# Patient Record
Sex: FEMALE | Race: WHITE | NOT HISPANIC OR LATINO | Employment: FULL TIME | URBAN - METROPOLITAN AREA
[De-identification: names, ages, dates, MRNs, and addresses within clinical notes are randomized per-mention and may not be internally consistent; named-entity substitution may affect disease eponyms.]

---

## 2018-02-23 ENCOUNTER — OFFICE VISIT (OUTPATIENT)
Dept: OBSTETRICS AND GYNECOLOGY | Age: 58
End: 2018-02-23

## 2018-02-23 VITALS
DIASTOLIC BLOOD PRESSURE: 68 MMHG | HEIGHT: 63 IN | SYSTOLIC BLOOD PRESSURE: 110 MMHG | BODY MASS INDEX: 24.98 KG/M2 | WEIGHT: 141 LBS

## 2018-02-23 DIAGNOSIS — Z01.419 WELL WOMAN EXAM WITH ROUTINE GYNECOLOGICAL EXAM: Primary | ICD-10-CM

## 2018-02-23 PROCEDURE — 99396 PREV VISIT EST AGE 40-64: CPT | Performed by: NURSE PRACTITIONER

## 2018-02-23 RX ORDER — OMEPRAZOLE 40 MG/1
40 CAPSULE, DELAYED RELEASE ORAL DAILY
COMMUNITY
End: 2020-08-13 | Stop reason: SDUPTHER

## 2018-02-23 RX ORDER — FLUOXETINE HYDROCHLORIDE 20 MG/1
40 CAPSULE ORAL DAILY
COMMUNITY
End: 2020-07-10 | Stop reason: SDUPTHER

## 2018-02-23 NOTE — PROGRESS NOTES
[facility]  Routine Annual Visit    2018    Patient: Lucrecia Hernandez          MR#:[id number]      History of Present Illness    57 y.o. female  who presents for annual exam. Last annual with Dr. Marie - pap negative.  Last mammogram negative 2017-negative per pt. Pt is postmenopausal- no bleeding. No HRT. Hx mild dysplasia. No colposcopy- discussing with Dr. Kehrer. Lucrecia has no complaints. No longer sexually active. Feeling great and healthy.    No LMP recorded (lmp unknown).  Obstetric History:  OB History      Para Term  AB Living    2 2 2   2    SAB TAB Ectopic Multiple Live Births        2         Menstrual History:     No LMP recorded (lmp unknown).       Sexual History:       ________________________________________  There is no problem list on file for this patient.      Past Medical History:   Diagnosis Date   • Anxiety    • Depression    • LGSIL on Pap smear of cervix        Past Surgical History:   Procedure Laterality Date   • LEEP  1996    leep times 2    • VEIN SURGERY      vein resection       History   Smoking Status   • Light Tobacco Smoker   Smokeless Tobacco   • Never Used       Family History   Problem Relation Age of Onset   • Hypertension Mother    • Hyperlipidemia Mother    • Coronary artery disease Mother    • Heart disease Mother    • Cancer Father 65     renal cell ca   • Seizures Father    • Lupus Father    • Heart disease Father    • Bipolar disorder Father    • Hypertension Brother    • Lung cancer Maternal Grandmother 78   • Other Paternal Grandmother      benign brain tumor   • Diabetes Paternal Grandmother    • Osteoporosis Paternal Grandmother    • Breast cancer Paternal Grandmother 80   • Diabetes Paternal Grandfather    • Coronary artery disease Paternal Grandfather    • Colon cancer Paternal Grandfather 66       Prior to Admission medications    Medication Sig Start Date End Date Taking? Authorizing Provider  "  FLUoxetine (PROzac) 20 MG capsule Take 40 mg by mouth Daily.   Yes Historical Provider, MD   omeprazole (priLOSEC) 40 MG capsule Take 40 mg by mouth Daily.   Yes Historical Provider, MD     ________________________________________    Current contraception: abstinence and post menopausal status  History of abnormal Pap smear: yes - mild dysplasia  Family history of uterine or ovarian cancer: no  Family History of colon cancer/colon polyps: no  History of abnormal mammogram: no  History of abnormal lipids: yes - on a statin    The following portions of the patient's history were reviewed and updated as appropriate: allergies, current medications, past family history, past medical history, past social history, past surgical history and problem list.    Review of Systems   Constitutional: Negative.    HENT: Negative.    Eyes: Negative for visual disturbance.   Respiratory: Negative for cough, shortness of breath and wheezing.    Cardiovascular: Negative for chest pain, palpitations and leg swelling.   Gastrointestinal: Negative for abdominal distention, abdominal pain, blood in stool, constipation, diarrhea, nausea and vomiting.   Endocrine: Negative for cold intolerance and heat intolerance.   Genitourinary: Negative for difficulty urinating, dyspareunia, dysuria, frequency, genital sores, hematuria, menstrual problem, pelvic pain, urgency, vaginal bleeding, vaginal discharge and vaginal pain.   Musculoskeletal: Negative.    Skin: Negative.    Neurological: Negative for dizziness, weakness, light-headedness, numbness and headaches.   Hematological: Negative.    Psychiatric/Behavioral: Negative.    Breasts: negative for lumps skin changes, dimpling, swelling, nipple changes/discharge bilaterally       Objective   Physical Exam    /68  Ht 160 cm (63\")  Wt 64 kg (141 lb)  LMP  (LMP Unknown) Comment: postmenopause  BMI 24.98 kg/m2   BP Readings from Last 3 Encounters:   02/23/18 110/68      Wt Readings from " "Last 3 Encounters:   02/23/18 64 kg (141 lb)        BMI: Estimated body mass index is 24.98 kg/(m^2) as calculated from the following:    Height as of this encounter: 160 cm (63\").    Weight as of this encounter: 64 kg (141 lb).      General:   alert, appears stated age and cooperative   Heart: regular rate and rhythm, S1, S2 normal, no murmur, click, rub or gallop   Lungs: clear to auscultation bilaterally   Abdomen: soft, non-tender, without masses or organomegaly   Breast: inspection negative, no nipple discharge or bleeding, no masses or nodularity palpable   Vulva: normal   Vagina: normal mucosa, normal discharge, atrophic   Cervix: no cervical motion tenderness, no lesions and atrophic   Uterus: normal size, mobile, non-tender or normal shape and consistency   Adnexa: no mass, fullness, tenderness     Assessment:    1. Normal annual exam   2. Healthy lifestyle modifications discussed, counseled on self breast exams and bone health. Recommend colonoscopy and DEXA.      Plan:    []  Rx:   []  Mammogram request made  [x]  PAP done  []  Occult fecal blood test ([facility])  []  Labs:   []  GC/Chl/TV  []  DEXA scan   []  Referral for colonoscopy:           MAGDA Martinez  2/23/2018 11:35 AM  "

## 2018-02-27 ENCOUNTER — TELEPHONE (OUTPATIENT)
Dept: OBSTETRICS AND GYNECOLOGY | Age: 58
End: 2018-02-27

## 2018-02-27 LAB
CYTOLOGIST CVX/VAG CYTO: NORMAL
CYTOLOGY CVX/VAG DOC THIN PREP: NORMAL
DX ICD CODE: NORMAL
HIV 1 & 2 AB SER-IMP: NORMAL
HPV I/H RISK 4 DNA CVX QL PROBE+SIG AMP: NEGATIVE
OTHER STN SPEC: NORMAL
PATH REPORT.FINAL DX SPEC: NORMAL
STAT OF ADQ CVX/VAG CYTO-IMP: NORMAL

## 2018-02-27 NOTE — TELEPHONE ENCOUNTER
----- Message from MAGDA Vilchis sent at 2/27/2018  9:16 AM EST -----  Please inform patient her pap smear returned negative (normal). Thank you.

## 2020-07-09 RX ORDER — FLUOXETINE HYDROCHLORIDE 40 MG/1
CAPSULE ORAL
Qty: 30 CAPSULE | Refills: 2 | OUTPATIENT
Start: 2020-07-09

## 2020-07-10 RX ORDER — FLUOXETINE HYDROCHLORIDE 40 MG/1
40 CAPSULE ORAL DAILY
Qty: 30 CAPSULE | Refills: 0 | Status: SHIPPED | OUTPATIENT
Start: 2020-07-10 | End: 2020-08-13 | Stop reason: SDUPTHER

## 2020-07-10 NOTE — TELEPHONE ENCOUNTER
PATIENT HAS AN APPOINTMENT ON 8/13/2020, SHE WAS LAST SEEN 07/29/2019 AT THE PREVIOUS OFFICE. SHE IS NEEDING A REFILL ON THE FLUOXETINE 40MG, ONE TABLET DAILY. WILL ONLY SEND IN ENOUGH UNTIL THAT APPOINTMENT.

## 2020-08-06 RX ORDER — FLUOXETINE HYDROCHLORIDE 40 MG/1
CAPSULE ORAL
Qty: 30 CAPSULE | Refills: 0 | OUTPATIENT
Start: 2020-08-06

## 2020-08-13 ENCOUNTER — OFFICE VISIT (OUTPATIENT)
Dept: FAMILY MEDICINE CLINIC | Facility: CLINIC | Age: 60
End: 2020-08-13

## 2020-08-13 ENCOUNTER — RESULTS ENCOUNTER (OUTPATIENT)
Dept: FAMILY MEDICINE CLINIC | Facility: CLINIC | Age: 60
End: 2020-08-13

## 2020-08-13 VITALS
BODY MASS INDEX: 25.83 KG/M2 | HEIGHT: 63 IN | TEMPERATURE: 96.6 F | DIASTOLIC BLOOD PRESSURE: 68 MMHG | WEIGHT: 145.8 LBS | SYSTOLIC BLOOD PRESSURE: 112 MMHG | OXYGEN SATURATION: 98 % | HEART RATE: 96 BPM

## 2020-08-13 DIAGNOSIS — Z23 NEED FOR PNEUMOCOCCAL VACCINE: ICD-10-CM

## 2020-08-13 DIAGNOSIS — Z11.59 NEED FOR HEPATITIS C SCREENING TEST: ICD-10-CM

## 2020-08-13 DIAGNOSIS — M15.9 PRIMARY OSTEOARTHRITIS INVOLVING MULTIPLE JOINTS: ICD-10-CM

## 2020-08-13 DIAGNOSIS — Z12.11 COLON CANCER SCREENING: ICD-10-CM

## 2020-08-13 DIAGNOSIS — G25.81 RESTLESS LEG SYNDROME: ICD-10-CM

## 2020-08-13 DIAGNOSIS — F43.23 ADJUSTMENT REACTION WITH ANXIETY AND DEPRESSION: ICD-10-CM

## 2020-08-13 DIAGNOSIS — Z23 NEED FOR TDAP VACCINATION: ICD-10-CM

## 2020-08-13 DIAGNOSIS — Z00.00 ROUTINE HEALTH MAINTENANCE: Primary | ICD-10-CM

## 2020-08-13 DIAGNOSIS — K21.9 GASTROESOPHAGEAL REFLUX DISEASE WITHOUT ESOPHAGITIS: ICD-10-CM

## 2020-08-13 DIAGNOSIS — Z79.899 CURRENT USE OF PROTON PUMP INHIBITOR: ICD-10-CM

## 2020-08-13 DIAGNOSIS — E78.5 HYPERLIPIDEMIA, UNSPECIFIED HYPERLIPIDEMIA TYPE: ICD-10-CM

## 2020-08-13 DIAGNOSIS — Z12.31 VISIT FOR SCREENING MAMMOGRAM: ICD-10-CM

## 2020-08-13 PROBLEM — M16.12 ARTHRITIS OF LEFT HIP: Status: ACTIVE | Noted: 2019-08-08

## 2020-08-13 PROCEDURE — 90472 IMMUNIZATION ADMIN EACH ADD: CPT | Performed by: FAMILY MEDICINE

## 2020-08-13 PROCEDURE — 90732 PPSV23 VACC 2 YRS+ SUBQ/IM: CPT | Performed by: FAMILY MEDICINE

## 2020-08-13 PROCEDURE — 99396 PREV VISIT EST AGE 40-64: CPT | Performed by: FAMILY MEDICINE

## 2020-08-13 PROCEDURE — 90471 IMMUNIZATION ADMIN: CPT | Performed by: FAMILY MEDICINE

## 2020-08-13 PROCEDURE — 90715 TDAP VACCINE 7 YRS/> IM: CPT | Performed by: FAMILY MEDICINE

## 2020-08-13 RX ORDER — ROPINIROLE 0.25 MG/1
TABLET, FILM COATED ORAL
Qty: 104 TABLET | Refills: 0 | Status: SHIPPED | OUTPATIENT
Start: 2020-08-13 | End: 2020-09-15 | Stop reason: DRUGHIGH

## 2020-08-13 RX ORDER — FLUOXETINE HYDROCHLORIDE 40 MG/1
40 CAPSULE ORAL DAILY
Qty: 90 CAPSULE | Refills: 3 | Status: SHIPPED | OUTPATIENT
Start: 2020-08-13 | End: 2020-11-19

## 2020-08-13 RX ORDER — OMEPRAZOLE 40 MG/1
40 CAPSULE, DELAYED RELEASE ORAL DAILY
Qty: 90 CAPSULE | Refills: 3 | Status: SHIPPED | OUTPATIENT
Start: 2020-08-13 | End: 2021-08-26 | Stop reason: SDUPTHER

## 2020-08-13 RX ORDER — ROPINIROLE 0.25 MG/1
TABLET, FILM COATED ORAL
Qty: 104 TABLET | Refills: 0 | Status: CANCELLED | OUTPATIENT
Start: 2020-08-13 | End: 2020-09-12
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
## 2020-08-13 NOTE — TELEPHONE ENCOUNTER
It has to be printed because the Sig is too long to be E prescribed.  We can just fax the printed one to the pharmacy.

## 2020-08-15 LAB
ALBUMIN SERPL-MCNC: 4.9 G/DL (ref 3.8–4.8)
ALBUMIN/GLOB SERPL: 2 {RATIO} (ref 1.2–2.2)
ALP SERPL-CCNC: 111 IU/L (ref 39–117)
ALT SERPL-CCNC: 35 IU/L (ref 0–32)
AST SERPL-CCNC: 27 IU/L (ref 0–40)
BASOPHILS # BLD AUTO: 0.1 X10E3/UL (ref 0–0.2)
BASOPHILS NFR BLD AUTO: 1 %
BILIRUB SERPL-MCNC: 0.4 MG/DL (ref 0–1.2)
BUN SERPL-MCNC: 13 MG/DL (ref 8–27)
BUN/CREAT SERPL: 13 (ref 12–28)
CALCIUM SERPL-MCNC: 9.9 MG/DL (ref 8.7–10.3)
CHLORIDE SERPL-SCNC: 103 MMOL/L (ref 96–106)
CHOLEST SERPL-MCNC: 252 MG/DL (ref 100–199)
CO2 SERPL-SCNC: 27 MMOL/L (ref 20–29)
CREAT SERPL-MCNC: 1.04 MG/DL (ref 0.57–1)
EOSINOPHIL # BLD AUTO: 0.2 X10E3/UL (ref 0–0.4)
EOSINOPHIL NFR BLD AUTO: 3 %
ERYTHROCYTE [DISTWIDTH] IN BLOOD BY AUTOMATED COUNT: 13.5 % (ref 11.7–15.4)
GLOBULIN SER CALC-MCNC: 2.5 G/DL (ref 1.5–4.5)
GLUCOSE SERPL-MCNC: 78 MG/DL (ref 65–99)
HCT VFR BLD AUTO: 43 % (ref 34–46.6)
HCV AB S/CO SERPL IA: <0.1 S/CO RATIO (ref 0–0.9)
HDLC SERPL-MCNC: 60 MG/DL
HGB BLD-MCNC: 14.2 G/DL (ref 11.1–15.9)
IMM GRANULOCYTES # BLD AUTO: 0 X10E3/UL (ref 0–0.1)
IMM GRANULOCYTES NFR BLD AUTO: 0 %
LDLC SERPL CALC-MCNC: 174 MG/DL (ref 0–99)
LYMPHOCYTES # BLD AUTO: 2.7 X10E3/UL (ref 0.7–3.1)
LYMPHOCYTES NFR BLD AUTO: 44 %
MCH RBC QN AUTO: 29.3 PG (ref 26.6–33)
MCHC RBC AUTO-ENTMCNC: 33 G/DL (ref 31.5–35.7)
MCV RBC AUTO: 89 FL (ref 79–97)
MONOCYTES # BLD AUTO: 0.5 X10E3/UL (ref 0.1–0.9)
MONOCYTES NFR BLD AUTO: 8 %
NEUTROPHILS # BLD AUTO: 2.6 X10E3/UL (ref 1.4–7)
NEUTROPHILS NFR BLD AUTO: 44 %
PLATELET # BLD AUTO: 251 X10E3/UL (ref 150–450)
POTASSIUM SERPL-SCNC: 4.5 MMOL/L (ref 3.5–5.2)
PROT SERPL-MCNC: 7.4 G/DL (ref 6–8.5)
RBC # BLD AUTO: 4.85 X10E6/UL (ref 3.77–5.28)
SODIUM SERPL-SCNC: 141 MMOL/L (ref 134–144)
TRIGL SERPL-MCNC: 88 MG/DL (ref 0–149)
TSH SERPL DL<=0.005 MIU/L-ACNC: 0.72 UIU/ML (ref 0.45–4.5)
VIT B12 SERPL-MCNC: 674 PG/ML (ref 232–1245)
VLDLC SERPL CALC-MCNC: 18 MG/DL (ref 5–40)
WBC # BLD AUTO: 6 X10E3/UL (ref 3.4–10.8)

## 2020-08-26 ENCOUNTER — TELEPHONE (OUTPATIENT)
Dept: FAMILY MEDICINE CLINIC | Facility: CLINIC | Age: 60
End: 2020-08-26

## 2020-09-15 ENCOUNTER — TELEMEDICINE - AUDIO (OUTPATIENT)
Dept: FAMILY MEDICINE CLINIC | Facility: CLINIC | Age: 60
End: 2020-09-15

## 2020-09-15 DIAGNOSIS — G25.81 RESTLESS LEG SYNDROME: Primary | ICD-10-CM

## 2020-09-15 DIAGNOSIS — Z12.31 VISIT FOR SCREENING MAMMOGRAM: ICD-10-CM

## 2020-09-15 PROCEDURE — 99213 OFFICE O/P EST LOW 20 MIN: CPT | Performed by: FAMILY MEDICINE

## 2020-09-15 RX ORDER — ROPINIROLE 1 MG/1
1 TABLET, FILM COATED ORAL NIGHTLY
Qty: 90 TABLET | Refills: 1 | Status: SHIPPED | OUTPATIENT
Start: 2020-09-15 | End: 2021-02-23 | Stop reason: SDUPTHER

## 2020-09-15 NOTE — PROGRESS NOTES
This was an audio and video enabled telemedicine encounter.  Length of visit 15 minutes.     Subjective   Lucrecia Hernandez is a 60 y.o. female.     Chief Complaint   Patient presents with   • Follow-up     Restless Leg        Patient presents for follow-up on her restless leg syndrome through virtual visit during the pandemic.  She is getting up once or twice at night only now and states that her RLS is about 90% better.    She has no significant complaints today.  She had her full physical last time.           The following portions of the patient's history were reviewed and updated as appropriate: allergies, current medications, past family history, past medical history, past social history, past surgical history and problem list.    Past Medical History:   Diagnosis Date   • Acrochordon    • Adjustment reaction with anxiety and depression    • Anxiety    • Degenerative lumbar disc    • Depression    • Elevated transaminase level    • Encounter for long-term (current) use of medications    • Encounter for routine gynecological examination    • Esophageal reflux disease    • Flu vaccine need    • History of anxiety disorder    • History of depression    • Hyperlipidemia    • Insomnia    • LGSIL on Pap smear of cervix 2011   • Lumbar radiculopathy    • Memory change    • Menopausal symptoms    • Need for DTaP and Hib vaccine     History of    • Need for hepatitis A vaccination    • Need for hepatitis C screening test    • OA (osteoarthritis) of hip    • Pain in joint of left hip    • Pain in joint of right hip    • Screening for malignant neoplasm of colon    • Seborrheic keratosis    • Tobacco use    • Visit for screening mammogram    • Weight gain        Past Surgical History:   Procedure Laterality Date   • LEEP  1996    leep times 2 1996/1997   • OTHER SURGICAL HISTORY  1994    Stab Phlebectomy of Varicose veins one extremity-Abstracted from [facility]   • VEIN SURGERY  1994    vein resection       Family History    Problem Relation Age of Onset   • Hypertension Mother    • Hyperlipidemia Mother    • Coronary artery disease Mother    • Heart disease Mother    • Cancer Father 65        renal cell ca   • Seizures Father    • Lupus Father    • Heart disease Father    • Bipolar disorder Father    • Hypertension Brother    • Lung cancer Maternal Grandmother 78   • Other Paternal Grandmother         benign brain tumor   • Diabetes Paternal Grandmother    • Osteoporosis Paternal Grandmother    • Breast cancer Paternal Grandmother 80   • Diabetes Paternal Grandfather    • Coronary artery disease Paternal Grandfather    • Colon cancer Paternal Grandfather 66   • Bipolar disorder Other        Social History     Socioeconomic History   • Marital status:      Spouse name: Not on file   • Number of children: Not on file   • Years of education: Not on file   • Highest education level: Not on file   Tobacco Use   • Smoking status: Smoker, Current Status Unknown   • Smokeless tobacco: Never Used   Substance and Sexual Activity   • Alcohol use: Yes     Alcohol/week: 4.0 - 6.0 standard drinks     Types: 4 - 6 Cans of beer per week   • Drug use: No   • Sexual activity: Never     Partners: Male     Birth control/protection: None         Current Outpatient Medications:   •  diclofenac (VOLTAREN) 1 % gel gel, Apply 4 g topically to the appropriate area as directed 4 (Four) Times a Day As Needed (pain)., Disp: 100 g, Rfl: 3  •  FLUoxetine (PROzac) 40 MG capsule, Take 1 capsule by mouth Daily. MUST KEEP APPOINTMENT, NO MORE REFILLS., Disp: 90 capsule, Rfl: 3  •  omeprazole (priLOSEC) 40 MG capsule, Take 1 capsule by mouth Daily., Disp: 90 capsule, Rfl: 3    Review of Systems   Constitutional: Negative for chills, fatigue and fever.   HENT: Negative for congestion, rhinorrhea and sore throat.    Respiratory: Negative for cough and shortness of breath.    Cardiovascular: Negative for chest pain and leg swelling.   Gastrointestinal: Negative  for abdominal pain.   Endocrine: Negative for polydipsia and polyuria.   Genitourinary: Negative for dysuria.   Musculoskeletal: Negative for arthralgias and myalgias.   Skin: Negative for rash.   Neurological: Negative for dizziness.   Hematological: Does not bruise/bleed easily.   Psychiatric/Behavioral: Negative for sleep disturbance.       Objective   There were no vitals filed for this visit.  There is no height or weight on file to calculate BMI.  Physical Exam  Had to convert to a phone visit due to technical difficulties.    Assessment/Plan   Lucrecia was seen today for follow-up.    Diagnoses and all orders for this visit:    Restless leg syndrome               There are no Patient Instructions on file for this visit.

## 2020-11-12 ENCOUNTER — OFFICE VISIT (OUTPATIENT)
Dept: FAMILY MEDICINE CLINIC | Facility: CLINIC | Age: 60
End: 2020-11-12

## 2020-11-12 VITALS
TEMPERATURE: 96 F | BODY MASS INDEX: 25.98 KG/M2 | OXYGEN SATURATION: 99 % | DIASTOLIC BLOOD PRESSURE: 74 MMHG | HEIGHT: 63 IN | WEIGHT: 146.6 LBS | SYSTOLIC BLOOD PRESSURE: 122 MMHG | HEART RATE: 91 BPM

## 2020-11-12 DIAGNOSIS — G25.81 RESTLESS LEG SYNDROME: Primary | ICD-10-CM

## 2020-11-12 DIAGNOSIS — F43.23 ADJUSTMENT REACTION WITH ANXIETY AND DEPRESSION: ICD-10-CM

## 2020-11-12 DIAGNOSIS — E78.5 HYPERLIPIDEMIA, UNSPECIFIED HYPERLIPIDEMIA TYPE: ICD-10-CM

## 2020-11-12 DIAGNOSIS — Z23 NEED FOR IMMUNIZATION AGAINST INFLUENZA: ICD-10-CM

## 2020-11-12 PROCEDURE — 99213 OFFICE O/P EST LOW 20 MIN: CPT | Performed by: FAMILY MEDICINE

## 2020-11-12 PROCEDURE — 90686 IIV4 VACC NO PRSV 0.5 ML IM: CPT | Performed by: FAMILY MEDICINE

## 2020-11-12 PROCEDURE — 90471 IMMUNIZATION ADMIN: CPT | Performed by: FAMILY MEDICINE

## 2020-11-12 NOTE — PROGRESS NOTES
Subjective   Lucrecia Hernandez is a 60 y.o. female.     Chief Complaint   Patient presents with   • Restless Legs Syndrome   • Hyperlipidemia                              Patient presents for follow-up.  She is doing well in general on her current medications for her mood and RLS.  She is not due for fasting blood work.  She needs refills of her medications and her flu shot today.  She is sad over the loss of a niece to suicide for depression.  She feels that her medication really helps her.  Though she denies SI and HI.  She denies hopelessness.         The following portions of the patient's history were reviewed and updated as appropriate: allergies, current medications, past family history, past medical history, past social history, past surgical history and problem list.    Past Medical History:   Diagnosis Date   • Acrochordon    • Adjustment reaction with anxiety and depression    • Anxiety    • Degenerative lumbar disc    • Depression    • Elevated transaminase level    • Encounter for long-term (current) use of medications    • Encounter for routine gynecological examination    • Esophageal reflux disease    • Flu vaccine need    • History of anxiety disorder    • History of depression    • Hyperlipidemia    • Insomnia    • LGSIL on Pap smear of cervix 2011   • Lumbar radiculopathy    • Memory change    • Menopausal symptoms    • Need for DTaP and Hib vaccine     History of    • Need for hepatitis A vaccination    • Need for hepatitis C screening test    • OA (osteoarthritis) of hip    • Pain in joint of left hip    • Pain in joint of right hip    • Screening for malignant neoplasm of colon    • Seborrheic keratosis    • Tobacco use    • Visit for screening mammogram    • Weight gain        Past Surgical History:   Procedure Laterality Date   • LEEP  1996    leep times 2 1996/1997   • OTHER SURGICAL HISTORY  1994    Stab Phlebectomy of Varicose veins one extremity-Abstracted from [facility]   • VEIN SURGERY  1994     vein resection       Family History   Problem Relation Age of Onset   • Hypertension Mother    • Hyperlipidemia Mother    • Coronary artery disease Mother    • Heart disease Mother    • Cancer Father 65        renal cell ca   • Seizures Father    • Lupus Father    • Heart disease Father    • Bipolar disorder Father    • Hypertension Brother    • Lung cancer Maternal Grandmother 78   • Other Paternal Grandmother         benign brain tumor   • Diabetes Paternal Grandmother    • Osteoporosis Paternal Grandmother    • Breast cancer Paternal Grandmother 80   • Diabetes Paternal Grandfather    • Coronary artery disease Paternal Grandfather    • Colon cancer Paternal Grandfather 66   • Bipolar disorder Other        Social History     Socioeconomic History   • Marital status:      Spouse name: Not on file   • Number of children: Not on file   • Years of education: Not on file   • Highest education level: Not on file   Tobacco Use   • Smoking status: Smoker, Current Status Unknown   • Smokeless tobacco: Never Used   Substance and Sexual Activity   • Alcohol use: Yes     Alcohol/week: 4.0 - 6.0 standard drinks     Types: 4 - 6 Cans of beer per week   • Drug use: No   • Sexual activity: Never     Partners: Male     Birth control/protection: None         Current Outpatient Medications:   •  diclofenac (VOLTAREN) 1 % gel gel, Apply 4 g topically to the appropriate area as directed 4 (Four) Times a Day As Needed (pain)., Disp: 100 g, Rfl: 3  •  FLUoxetine (PROzac) 40 MG capsule, Take 1 capsule by mouth Daily. MUST KEEP APPOINTMENT, NO MORE REFILLS., Disp: 90 capsule, Rfl: 3  •  omeprazole (priLOSEC) 40 MG capsule, Take 1 capsule by mouth Daily., Disp: 90 capsule, Rfl: 3  •  rOPINIRole (Requip) 1 MG tablet, Take 1 tablet by mouth Every Night. Take 1 hour before bedtime., Disp: 90 tablet, Rfl: 1    Review of Systems   Constitutional: Negative for chills, fatigue and fever.   HENT: Negative for congestion, rhinorrhea and  "sore throat.    Respiratory: Negative for cough and shortness of breath.    Cardiovascular: Negative for chest pain and leg swelling.   Gastrointestinal: Negative for abdominal pain.   Endocrine: Negative for polydipsia and polyuria.   Genitourinary: Negative for dysuria.   Musculoskeletal: Negative for arthralgias and myalgias.   Skin: Negative for rash.   Neurological: Negative for dizziness.   Hematological: Does not bruise/bleed easily.   Psychiatric/Behavioral: Negative for sleep disturbance.       Objective   Vitals:    11/12/20 1021   BP: 122/74   Pulse: 91   Temp: 96 °F (35.6 °C)   SpO2: 99%   Weight: 66.5 kg (146 lb 9.6 oz)   Height: 160 cm (63\")     Body mass index is 25.97 kg/m².  Physical Exam  Constitutional:       General: She is not in acute distress.     Appearance: Normal appearance. She is well-developed.   HENT:      Head: Normocephalic and atraumatic.      Right Ear: Tympanic membrane, ear canal and external ear normal.      Left Ear: Tympanic membrane, ear canal and external ear normal.      Mouth/Throat:      Mouth: Mucous membranes are moist.      Pharynx: Oropharynx is clear.   Eyes:      Conjunctiva/sclera: Conjunctivae normal.      Pupils: Pupils are equal, round, and reactive to light.   Neck:      Musculoskeletal: Neck supple.      Thyroid: No thyromegaly.   Cardiovascular:      Rate and Rhythm: Normal rate and regular rhythm.      Heart sounds: No murmur.   Pulmonary:      Effort: Pulmonary effort is normal.      Breath sounds: Normal breath sounds. No wheezing.   Abdominal:      General: Bowel sounds are normal.      Palpations: Abdomen is soft.      Tenderness: There is no abdominal tenderness.   Musculoskeletal: Normal range of motion.   Lymphadenopathy:      Cervical: No cervical adenopathy.   Skin:     General: Skin is warm and dry.   Neurological:      Mental Status: She is alert and oriented to person, place, and time.   Psychiatric:         Mood and Affect: Mood normal.         " Behavior: Behavior normal.           Assessment/Plan   Diagnoses and all orders for this visit:    1. Restless leg syndrome (Primary)    2. Adjustment reaction with anxiety and depression    3. Hyperlipidemia, unspecified hyperlipidemia type    4. Need for immunization against influenza  -     FluLaval Quad >6 Months ([id number])               There are no Patient Instructions on file for this visit.

## 2020-11-19 DIAGNOSIS — F43.23 ADJUSTMENT REACTION WITH ANXIETY AND DEPRESSION: ICD-10-CM

## 2020-11-19 RX ORDER — FLUOXETINE HYDROCHLORIDE 40 MG/1
CAPSULE ORAL
Qty: 90 CAPSULE | Refills: 3 | Status: SHIPPED | OUTPATIENT
Start: 2020-11-19 | End: 2021-02-23

## 2021-02-23 ENCOUNTER — OFFICE VISIT (OUTPATIENT)
Dept: FAMILY MEDICINE CLINIC | Facility: CLINIC | Age: 61
End: 2021-02-23

## 2021-02-23 VITALS
DIASTOLIC BLOOD PRESSURE: 68 MMHG | BODY MASS INDEX: 26.4 KG/M2 | SYSTOLIC BLOOD PRESSURE: 128 MMHG | HEART RATE: 90 BPM | OXYGEN SATURATION: 99 % | WEIGHT: 149 LBS | HEIGHT: 63 IN | TEMPERATURE: 97.3 F

## 2021-02-23 DIAGNOSIS — K21.9 GASTROESOPHAGEAL REFLUX DISEASE WITHOUT ESOPHAGITIS: Chronic | ICD-10-CM

## 2021-02-23 DIAGNOSIS — G25.81 RESTLESS LEG SYNDROME: Primary | Chronic | ICD-10-CM

## 2021-02-23 DIAGNOSIS — F43.21 GRIEF: ICD-10-CM

## 2021-02-23 PROBLEM — F43.23 ADJUSTMENT REACTION WITH ANXIETY AND DEPRESSION: Status: RESOLVED | Noted: 2020-08-13 | Resolved: 2021-02-23

## 2021-02-23 PROCEDURE — 99213 OFFICE O/P EST LOW 20 MIN: CPT | Performed by: FAMILY MEDICINE

## 2021-02-23 RX ORDER — ROPINIROLE 1 MG/1
1 TABLET, FILM COATED ORAL NIGHTLY
Qty: 90 TABLET | Refills: 1 | Status: SHIPPED | OUTPATIENT
Start: 2021-02-23 | End: 2021-08-26

## 2021-02-23 NOTE — PROGRESS NOTES
"Chief Complaint  Anxiety (follow up on mood) and Restless Legs Syndrome    Subjective          Lucrecia Hernandez presents to [facility]  Patient presents to follow-up on her restless leg syndrome.  She states the ropinirole really helps.  She gets a good full night sleep.  As far as her mood, she did stop the fluoxetine.  She feels like she does not need it for her mood.  She denies any significant depressive or anxiety symptoms.  They did find a letter that her niece left after committing suicide in October and that really helped give her some closure.  She feels that she is handling her grief much better.  She is also doing better with the hot flashes so really does not want the fluoxetine anymore.  She still needs her medicine for her reflux daily.  She is due for her physical in 6 months.         Objective   Vital Signs:   /68   Pulse 90   Temp 97.3 °F (36.3 °C)   Ht 160 cm (63\")   Wt 67.6 kg (149 lb)   SpO2 99%   BMI 26.39 kg/m²     Physical Exam  Vitals signs and nursing note reviewed.   Constitutional:       General: She is not in acute distress.  HENT:      Head: Normocephalic and atraumatic.      Right Ear: External ear normal.      Left Ear: External ear normal.   Eyes:      Conjunctiva/sclera: Conjunctivae normal.      Pupils: Pupils are equal, round, and reactive to light.   Cardiovascular:      Rate and Rhythm: Normal rate and regular rhythm.      Heart sounds: No murmur.   Pulmonary:      Effort: No respiratory distress.   Neurological:      Mental Status: She is alert and oriented to person, place, and time.   Psychiatric:         Mood and Affect: Mood normal.         Behavior: Behavior normal.        Result Review :                 Assessment and Plan    Diagnoses and all orders for this visit:    1. Restless leg syndrome (Primary)  -     rOPINIRole (Requip) 1 MG tablet; Take 1 tablet by mouth Every Night. Take 1 hour before bedtime.  Dispense: 90 tablet; Refill: " 1    2. Gastroesophageal reflux disease without esophagitis    3. Grief        Follow Up   Return in about 6 months (around 8/23/2021) for Annual physical.  Patient was given instructions and counseling regarding her condition or for health maintenance advice. Please see specific information pulled into the AVS if appropriate.

## 2021-08-26 ENCOUNTER — OFFICE VISIT (OUTPATIENT)
Dept: FAMILY MEDICINE CLINIC | Facility: CLINIC | Age: 61
End: 2021-08-26

## 2021-08-26 VITALS
DIASTOLIC BLOOD PRESSURE: 78 MMHG | HEART RATE: 111 BPM | SYSTOLIC BLOOD PRESSURE: 122 MMHG | OXYGEN SATURATION: 99 % | TEMPERATURE: 98.2 F | WEIGHT: 144.2 LBS | BODY MASS INDEX: 25.55 KG/M2 | HEIGHT: 63 IN

## 2021-08-26 DIAGNOSIS — K21.9 GASTROESOPHAGEAL REFLUX DISEASE WITHOUT ESOPHAGITIS: ICD-10-CM

## 2021-08-26 DIAGNOSIS — E78.5 HYPERLIPIDEMIA, UNSPECIFIED HYPERLIPIDEMIA TYPE: ICD-10-CM

## 2021-08-26 DIAGNOSIS — Z00.00 ROUTINE HEALTH MAINTENANCE: Primary | ICD-10-CM

## 2021-08-26 DIAGNOSIS — Z72.0 TOBACCO USE: ICD-10-CM

## 2021-08-26 DIAGNOSIS — Z79.899 CURRENT USE OF PROTON PUMP INHIBITOR: ICD-10-CM

## 2021-08-26 DIAGNOSIS — Z12.31 VISIT FOR SCREENING MAMMOGRAM: ICD-10-CM

## 2021-08-26 DIAGNOSIS — M15.9 PRIMARY OSTEOARTHRITIS INVOLVING MULTIPLE JOINTS: ICD-10-CM

## 2021-08-26 DIAGNOSIS — G25.81 RESTLESS LEG SYNDROME: ICD-10-CM

## 2021-08-26 PROCEDURE — 99396 PREV VISIT EST AGE 40-64: CPT | Performed by: FAMILY MEDICINE

## 2021-08-26 PROCEDURE — 99213 OFFICE O/P EST LOW 20 MIN: CPT | Performed by: FAMILY MEDICINE

## 2021-08-26 RX ORDER — BUPROPION HYDROCHLORIDE 150 MG/1
150 TABLET, EXTENDED RELEASE ORAL 2 TIMES DAILY
Qty: 180 TABLET | Refills: 1 | Status: SHIPPED | OUTPATIENT
Start: 2021-08-26 | End: 2022-03-02 | Stop reason: DRUGHIGH

## 2021-08-26 RX ORDER — MELOXICAM 15 MG/1
15 TABLET ORAL DAILY
Qty: 90 TABLET | Refills: 1 | Status: SHIPPED | OUTPATIENT
Start: 2021-08-26 | End: 2022-09-02

## 2021-08-26 RX ORDER — ROPINIROLE 1 MG/1
1 TABLET, FILM COATED ORAL NIGHTLY
Qty: 90 TABLET | Refills: 3 | Status: SHIPPED | OUTPATIENT
Start: 2021-08-26 | End: 2022-01-31

## 2021-08-26 RX ORDER — OMEPRAZOLE 40 MG/1
40 CAPSULE, DELAYED RELEASE ORAL DAILY
Qty: 90 CAPSULE | Refills: 3 | Status: SHIPPED | OUTPATIENT
Start: 2021-08-26 | End: 2022-09-02 | Stop reason: SDUPTHER

## 2021-08-26 NOTE — PROGRESS NOTES
Subjective   Lucrecia Hernandez is a 60 y.o. female who presents for annual female wellness exam.  Chief Complaint   Patient presents with   • Annual Exam     no pap, pt sees OB     Doing well in general.  Fasting this am.   RLS doing OK.  Compliant with medication.  Has had some increased arthritis that she would like help with.  Over-the-counter medications have not helped much.  Gets aches and pains in multiple joints.  Patient would also like help quitting smoking.  Wellbutrin helped in the past.    Menstrual History: menopause  Pregnancy History:   Sexual History: not now   Contraception: na  Hormone Replacement Therapy: na  Diet: standard  Exercise: yes, walks  Do you feel safe? No, afraid to go out because of violence  Have you ever been abused? no    Mammogram: due  Pap Smear: sees gyn  Bone Density: defers  Colon Cancer Screening: cologuard     Immunization History   Administered Date(s) Administered   • COVID-19 (MODERNA) 2021, 2021   • Flu Vaccine Intradermal Quad 18-64YR 2018   • FluLaval >6 Months 2020   • Hepatitis A 2018, 2019   • Influenza, Unspecified 2018   • Pneumococcal Polysaccharide (PPSV23) 2020   • Td 2011   • Tdap 2020       The following portions of the patient's history were reviewed and updated as appropriate: allergies, current medications, past family history, past medical history, past social history, past surgical history and problem list.    Past Medical History:   Diagnosis Date   • Acrochordon    • Adjustment reaction with anxiety and depression    • Anxiety    • Degenerative lumbar disc    • Depression    • Elevated transaminase level    • Encounter for long-term (current) use of medications    • Encounter for routine gynecological examination    • Esophageal reflux disease    • Flu vaccine need    • History of anxiety disorder    • History of depression    • Hyperlipidemia    • Insomnia    • LGSIL on Pap smear of  cervix 2011   • Lumbar radiculopathy    • Memory change    • Menopausal symptoms    • Need for DTaP and Hib vaccine     History of    • Need for hepatitis A vaccination    • Need for hepatitis C screening test    • OA (osteoarthritis) of hip    • Pain in joint of left hip    • Pain in joint of right hip    • Screening for malignant neoplasm of colon    • Seborrheic keratosis    • Tobacco use    • Visit for screening mammogram    • Weight gain        Past Surgical History:   Procedure Laterality Date   • LEEP  1996    leep times 2 1996/1997   • OTHER SURGICAL HISTORY  1994    Stab Phlebectomy of Varicose veins one extremity-Abstracted from [facility]   • VEIN SURGERY  1994    vein resection       Family History   Problem Relation Age of Onset   • Hypertension Mother    • Hyperlipidemia Mother    • Coronary artery disease Mother    • Heart disease Mother    • Cancer Father 65        renal cell ca   • Seizures Father    • Lupus Father    • Heart disease Father    • Bipolar disorder Father    • Hypertension Brother    • Lung cancer Maternal Grandmother 78   • Other Paternal Grandmother         benign brain tumor   • Diabetes Paternal Grandmother    • Osteoporosis Paternal Grandmother    • Breast cancer Paternal Grandmother 80   • Diabetes Paternal Grandfather    • Coronary artery disease Paternal Grandfather    • Colon cancer Paternal Grandfather 66   • Bipolar disorder Other        Social History     Socioeconomic History   • Marital status:      Spouse name: Not on file   • Number of children: Not on file   • Years of education: Not on file   • Highest education level: Not on file   Tobacco Use   • Smoking status: Smoker, Current Status Unknown   • Smokeless tobacco: Never Used   Substance and Sexual Activity   • Alcohol use: Yes     Alcohol/week: 4.0 - 6.0 standard drinks     Types: 4 - 6 Cans of beer per week   • Drug use: No   • Sexual activity: Never     Partners: Male     Birth control/protection: None  "        Current Outpatient Medications:   •  diclofenac (VOLTAREN) 1 % gel gel, Apply 4 g topically to the appropriate area as directed 4 (Four) Times a Day As Needed (pain)., Disp: 100 g, Rfl: 3  •  omeprazole (priLOSEC) 40 MG capsule, Take 1 capsule by mouth Daily., Disp: 90 capsule, Rfl: 3  •  rOPINIRole (Requip) 1 MG tablet, Take 1 tablet by mouth Every Night. Take 1 hour before bedtime., Disp: 90 tablet, Rfl: 3  •  buPROPion SR (Wellbutrin SR) 150 MG 12 hr tablet, Take 1 tablet by mouth 2 (Two) Times a Day., Disp: 180 tablet, Rfl: 1  •  meloxicam (MOBIC) 15 MG tablet, Take 1 tablet by mouth Daily., Disp: 90 tablet, Rfl: 1    Review of Systems    Objective   Vitals:    08/26/21 0840   BP: 122/78   Pulse: 111   Temp: 98.2 °F (36.8 °C)   SpO2: 99%   Weight: 65.4 kg (144 lb 3.2 oz)   Height: 160 cm (63\")     Body mass index is 25.54 kg/m².  Physical Exam  Vitals and nursing note reviewed.   Constitutional:       General: She is not in acute distress.     Appearance: Normal appearance. She is well-developed.   HENT:      Head: Normocephalic and atraumatic.      Right Ear: Tympanic membrane, ear canal and external ear normal.      Left Ear: Tympanic membrane, ear canal and external ear normal.      Nose: Nose normal.      Mouth/Throat:      Mouth: Mucous membranes are moist.      Pharynx: Oropharynx is clear. No oropharyngeal exudate or posterior oropharyngeal erythema.   Eyes:      Conjunctiva/sclera: Conjunctivae normal.      Pupils: Pupils are equal, round, and reactive to light.   Neck:      Thyroid: No thyromegaly.   Cardiovascular:      Rate and Rhythm: Normal rate and regular rhythm.      Heart sounds: No murmur heard.     Pulmonary:      Effort: Pulmonary effort is normal.      Breath sounds: Normal breath sounds. No wheezing.   Abdominal:      General: Abdomen is flat. Bowel sounds are normal. There is no distension.      Palpations: Abdomen is soft. There is no mass.      Tenderness: There is no abdominal " tenderness.      Hernia: No hernia is present.   Musculoskeletal:         General: No swelling. Normal range of motion.      Cervical back: Normal range of motion and neck supple.      Right lower leg: No edema.      Left lower leg: No edema.   Lymphadenopathy:      Cervical: No cervical adenopathy.   Skin:     General: Skin is warm and dry.      Capillary Refill: Capillary refill takes less than 2 seconds.      Findings: No rash.   Neurological:      General: No focal deficit present.      Mental Status: She is alert and oriented to person, place, and time.      Cranial Nerves: No cranial nerve deficit.   Psychiatric:         Mood and Affect: Mood normal.         Behavior: Behavior normal.       Lucrecia Hernandez  reports that she has been smoking. She has never used smokeless tobacco.. I have educated her on the risk of diseases from using tobacco products such as cancer, COPD and heart disease.     I advised her to quit and she is willing to quit. We have discussed the following method/s for tobacco cessation:  Education Material.  Together we have set a quit date for 1 week.  She will follow up with me in 3 months or sooner to check on her progress.    I spent 5 minutes counseling the patient.         Assessment/Plan   Diagnoses and all orders for this visit:    1. Routine health maintenance (Primary)    2. Hyperlipidemia, unspecified hyperlipidemia type  -     Lipid Panel  -     CBC & Differential  -     Comprehensive Metabolic Panel  -     TSH    3. Gastroesophageal reflux disease without esophagitis  -     omeprazole (priLOSEC) 40 MG capsule; Take 1 capsule by mouth Daily.  Dispense: 90 capsule; Refill: 3    4. Restless leg syndrome  -     rOPINIRole (Requip) 1 MG tablet; Take 1 tablet by mouth Every Night. Take 1 hour before bedtime.  Dispense: 90 tablet; Refill: 3    5. Primary osteoarthritis involving multiple joints  -     meloxicam (MOBIC) 15 MG tablet; Take 1 tablet by mouth Daily.  Dispense: 90 tablet;  Refill: 1    6. Visit for screening mammogram  -     Mammo Screening Digital Tomosynthesis Bilateral With CAD; Future    7. Current use of proton pump inhibitor  -     Vitamin B12    8. Tobacco use  -     buPROPion SR (Wellbutrin SR) 150 MG 12 hr tablet; Take 1 tablet by mouth 2 (Two) Times a Day.  Dispense: 180 tablet; Refill: 1               Discussed the importance of maintaining a healthy weight and getting regular exercise.  Educated patient on the benefits of healthy diet.  Advise follow-up annually for wellness exams.    Patient Instructions   Talk to insurance about shingles vaccine, Shingrix.       Tobacco Use Disorder  Tobacco use disorder (TUD) occurs when a person craves, seeks, and uses tobacco, regardless of the consequences. This disorder can cause problems with mental and physical health. It can affect your ability to have healthy relationships, and it can keep you from meeting your responsibilities at work, home, or school.  Tobacco may be:  · Smoked as a cigarette or cigar.  · Inhaled using e-cigarettes.  · Smoked in a pipe or hookah.  · Chewed as smokeless tobacco.  · Inhaled into the nostrils as snuff.  Tobacco products contain a dangerous chemical called nicotine, which is very addictive. Nicotine triggers hormones that make the body feel stimulated and works on areas of the brain that make you feel good. These effects can make it hard for people to quit nicotine.  Tobacco contains many other unsafe chemicals that can damage almost every organ in the body. Smoking tobacco also puts others in danger due to fire risk and possible health problems caused by breathing in secondhand smoke.  What are the signs or symptoms?  Symptoms of TUD may include:  · Being unable to slow down or stop your tobacco use.  · Spending an abnormal amount of time getting or using tobacco.  · Craving tobacco. Cravings may last for up to 6 months after quitting.  · Tobacco use that:  ? Interferes with your work, school, or  home life.  ? Interferes with your personal and social relationships.  ? Makes you give up activities that you once enjoyed or found important.  · Using tobacco even though you know that it is:  ? Dangerous or bad for your health or someone else's health.  ? Causing problems in your life.  · Needing more and more of the substance to get the same effect (developing tolerance).  · Experiencing unpleasant symptoms if you do not use the substance (withdrawal). Withdrawal symptoms may include:  ? Depressed, anxious, or irritable mood.  ? Difficulty concentrating.  ? Increased appetite.  ? Restlessness or trouble sleeping.  · Using the substance to avoid withdrawal.  How is this diagnosed?  This condition may be diagnosed based on:  · Your current and past tobacco use. Your health care provider may ask questions about how your tobacco use affects your life.  · A physical exam.  You may be diagnosed with TUD if you have at least two symptoms within a 12-month period.  How is this treated?  This condition is treated by stopping tobacco use. Many people are unable to quit on their own and need help. Treatment may include:  · Nicotine replacement therapy (NRT). NRT provides nicotine without the other harmful chemicals in tobacco. NRT gradually lowers the dosage of nicotine in the body and reduces withdrawal symptoms. NRT is available as:  ? Over-the-counter gums, lozenges, and skin patches.  ? Prescription mouth inhalers and nasal sprays.  · Medicine that acts on the brain to reduce cravings and withdrawal symptoms.  · A type of talk therapy that examines your triggers for tobacco use, how to avoid them, and how to cope with cravings (behavioral therapy).  · Hypnosis. This may help with withdrawal symptoms.  · Joining a support group for others coping with TUD.  The best treatment for TUD is usually a combination of medicine, talk therapy, and support groups. Recovery can be a long process. Many people start using tobacco  again after stopping (relapse). If you relapse, it does not mean that treatment will not work.  Follow these instructions at home:    Lifestyle  · Do not use any products that contain nicotine or tobacco, such as cigarettes and e-cigarettes.  · Avoid things that trigger tobacco use as much as you can. Triggers include people and situations that usually cause you to use tobacco.  · Avoid drinks that contain caffeine, including coffee. These may worsen some withdrawal symptoms.  · Find ways to manage stress. Wanting to smoke may cause stress, and stress can make you want to smoke. Relaxation techniques such as deep breathing, meditation, and yoga may help.  · Attend support groups as needed. These groups are an important part of long-term recovery for many people.  General instructions  · Take over-the-counter and prescription medicines only as told by your health care provider.  · Check with your health care provider before taking any new prescription or over-the-counter medicines.  · Decide on a friend, family member, or smoking quit-line (such as 1-800-QUIT-NOW in the [place] that you can call or text when you feel the urge to smoke or when you need help coping with cravings.  · Keep all follow-up visits as told by your health care provider and therapist. This is important.  Contact a health care provider if:  · You are not able to take your medicines as prescribed.  · Your symptoms get worse, even with treatment.  Summary  · Tobacco use disorder (TUD) occurs when a person craves, seeks, and uses tobacco regardless of the consequences.  · This condition may be diagnosed based on your current and past tobacco use and a physical exam.  · Many people are unable to quit on their own and need help. Recovery can be a long process.  · The most effective treatment for TUD is usually a combination of medicine, talk therapy, and support groups.  This information is not intended to replace advice given to you by your health  care provider. Make sure you discuss any questions you have with your health care provider.  Document Revised: 12/05/2018 Document Reviewed: 12/05/2018  [employer]vier Patient Education © 2021 [employer]

## 2021-08-26 NOTE — PATIENT INSTRUCTIONS
Talk to insurance about shingles vaccine, Shingrix.       Tobacco Use Disorder  Tobacco use disorder (TUD) occurs when a person craves, seeks, and uses tobacco, regardless of the consequences. This disorder can cause problems with mental and physical health. It can affect your ability to have healthy relationships, and it can keep you from meeting your responsibilities at work, home, or school.  Tobacco may be:  · Smoked as a cigarette or cigar.  · Inhaled using e-cigarettes.  · Smoked in a pipe or hookah.  · Chewed as smokeless tobacco.  · Inhaled into the nostrils as snuff.  Tobacco products contain a dangerous chemical called nicotine, which is very addictive. Nicotine triggers hormones that make the body feel stimulated and works on areas of the brain that make you feel good. These effects can make it hard for people to quit nicotine.  Tobacco contains many other unsafe chemicals that can damage almost every organ in the body. Smoking tobacco also puts others in danger due to fire risk and possible health problems caused by breathing in secondhand smoke.  What are the signs or symptoms?  Symptoms of TUD may include:  · Being unable to slow down or stop your tobacco use.  · Spending an abnormal amount of time getting or using tobacco.  · Craving tobacco. Cravings may last for up to 6 months after quitting.  · Tobacco use that:  ? Interferes with your work, school, or home life.  ? Interferes with your personal and social relationships.  ? Makes you give up activities that you once enjoyed or found important.  · Using tobacco even though you know that it is:  ? Dangerous or bad for your health or someone else's health.  ? Causing problems in your life.  · Needing more and more of the substance to get the same effect (developing tolerance).  · Experiencing unpleasant symptoms if you do not use the substance (withdrawal). Withdrawal symptoms may include:  ? Depressed, anxious, or irritable mood.  ? Difficulty  concentrating.  ? Increased appetite.  ? Restlessness or trouble sleeping.  · Using the substance to avoid withdrawal.  How is this diagnosed?  This condition may be diagnosed based on:  · Your current and past tobacco use. Your health care provider may ask questions about how your tobacco use affects your life.  · A physical exam.  You may be diagnosed with TUD if you have at least two symptoms within a 12-month period.  How is this treated?  This condition is treated by stopping tobacco use. Many people are unable to quit on their own and need help. Treatment may include:  · Nicotine replacement therapy (NRT). NRT provides nicotine without the other harmful chemicals in tobacco. NRT gradually lowers the dosage of nicotine in the body and reduces withdrawal symptoms. NRT is available as:  ? Over-the-counter gums, lozenges, and skin patches.  ? Prescription mouth inhalers and nasal sprays.  · Medicine that acts on the brain to reduce cravings and withdrawal symptoms.  · A type of talk therapy that examines your triggers for tobacco use, how to avoid them, and how to cope with cravings (behavioral therapy).  · Hypnosis. This may help with withdrawal symptoms.  · Joining a support group for others coping with TUD.  The best treatment for TUD is usually a combination of medicine, talk therapy, and support groups. Recovery can be a long process. Many people start using tobacco again after stopping (relapse). If you relapse, it does not mean that treatment will not work.  Follow these instructions at home:    Lifestyle  · Do not use any products that contain nicotine or tobacco, such as cigarettes and e-cigarettes.  · Avoid things that trigger tobacco use as much as you can. Triggers include people and situations that usually cause you to use tobacco.  · Avoid drinks that contain caffeine, including coffee. These may worsen some withdrawal symptoms.  · Find ways to manage stress. Wanting to smoke may cause stress, and  stress can make you want to smoke. Relaxation techniques such as deep breathing, meditation, and yoga may help.  · Attend support groups as needed. These groups are an important part of long-term recovery for many people.  General instructions  · Take over-the-counter and prescription medicines only as told by your health care provider.  · Check with your health care provider before taking any new prescription or over-the-counter medicines.  · Decide on a friend, family member, or smoking quit-line (such as 1-800-QUIT-NOW in the [place] that you can call or text when you feel the urge to smoke or when you need help coping with cravings.  · Keep all follow-up visits as told by your health care provider and therapist. This is important.  Contact a health care provider if:  · You are not able to take your medicines as prescribed.  · Your symptoms get worse, even with treatment.  Summary  · Tobacco use disorder (TUD) occurs when a person craves, seeks, and uses tobacco regardless of the consequences.  · This condition may be diagnosed based on your current and past tobacco use and a physical exam.  · Many people are unable to quit on their own and need help. Recovery can be a long process.  · The most effective treatment for TUD is usually a combination of medicine, talk therapy, and support groups.  This information is not intended to replace advice given to you by your health care provider. Make sure you discuss any questions you have with your health care provider.  Document Revised: 12/05/2018 Document Reviewed: 12/05/2018  [employer] Patient Education © 2021 [employer]

## 2021-08-27 LAB
ALBUMIN SERPL-MCNC: 4.8 G/DL (ref 3.5–5.2)
ALBUMIN/GLOB SERPL: 1.8 G/DL
ALP SERPL-CCNC: 121 U/L (ref 39–117)
ALT SERPL-CCNC: 80 U/L (ref 1–33)
AST SERPL-CCNC: 34 U/L (ref 1–32)
BASOPHILS # BLD AUTO: 0.08 10*3/MM3 (ref 0–0.2)
BASOPHILS NFR BLD AUTO: 1.2 % (ref 0–1.5)
BILIRUB SERPL-MCNC: 0.3 MG/DL (ref 0–1.2)
BUN SERPL-MCNC: 13 MG/DL (ref 8–23)
BUN/CREAT SERPL: 13.5 (ref 7–25)
CALCIUM SERPL-MCNC: 10.2 MG/DL (ref 8.6–10.5)
CHLORIDE SERPL-SCNC: 104 MMOL/L (ref 98–107)
CHOLEST SERPL-MCNC: 265 MG/DL (ref 0–200)
CO2 SERPL-SCNC: 27.4 MMOL/L (ref 22–29)
CREAT SERPL-MCNC: 0.96 MG/DL (ref 0.57–1)
EOSINOPHIL # BLD AUTO: 0.2 10*3/MM3 (ref 0–0.4)
EOSINOPHIL NFR BLD AUTO: 3 % (ref 0.3–6.2)
ERYTHROCYTE [DISTWIDTH] IN BLOOD BY AUTOMATED COUNT: 13.8 % (ref 12.3–15.4)
GLOBULIN SER CALC-MCNC: 2.7 GM/DL
GLUCOSE SERPL-MCNC: 85 MG/DL (ref 65–99)
HCT VFR BLD AUTO: 42.1 % (ref 34–46.6)
HDLC SERPL-MCNC: 62 MG/DL (ref 40–60)
HGB BLD-MCNC: 14.1 G/DL (ref 12–15.9)
IMM GRANULOCYTES # BLD AUTO: 0.01 10*3/MM3 (ref 0–0.05)
IMM GRANULOCYTES NFR BLD AUTO: 0.1 % (ref 0–0.5)
LDLC SERPL CALC-MCNC: 190 MG/DL (ref 0–100)
LYMPHOCYTES # BLD AUTO: 2.48 10*3/MM3 (ref 0.7–3.1)
LYMPHOCYTES NFR BLD AUTO: 36.9 % (ref 19.6–45.3)
MCH RBC QN AUTO: 28.9 PG (ref 26.6–33)
MCHC RBC AUTO-ENTMCNC: 33.5 G/DL (ref 31.5–35.7)
MCV RBC AUTO: 86.3 FL (ref 79–97)
MONOCYTES # BLD AUTO: 0.37 10*3/MM3 (ref 0.1–0.9)
MONOCYTES NFR BLD AUTO: 5.5 % (ref 5–12)
NEUTROPHILS # BLD AUTO: 3.58 10*3/MM3 (ref 1.7–7)
NEUTROPHILS NFR BLD AUTO: 53.3 % (ref 42.7–76)
NRBC BLD AUTO-RTO: 0 /100 WBC (ref 0–0.2)
PLATELET # BLD AUTO: 257 10*3/MM3 (ref 140–450)
POTASSIUM SERPL-SCNC: 5.6 MMOL/L (ref 3.5–5.2)
PROT SERPL-MCNC: 7.5 G/DL (ref 6–8.5)
RBC # BLD AUTO: 4.88 10*6/MM3 (ref 3.77–5.28)
SODIUM SERPL-SCNC: 142 MMOL/L (ref 136–145)
TRIGL SERPL-MCNC: 81 MG/DL (ref 0–150)
TSH SERPL DL<=0.005 MIU/L-ACNC: 1.08 UIU/ML (ref 0.27–4.2)
VIT B12 SERPL-MCNC: 747 PG/ML (ref 211–946)
VLDLC SERPL CALC-MCNC: 13 MG/DL (ref 5–40)
WBC # BLD AUTO: 6.72 10*3/MM3 (ref 3.4–10.8)

## 2021-09-14 ENCOUNTER — TELEPHONE (OUTPATIENT)
Dept: FAMILY MEDICINE CLINIC | Facility: CLINIC | Age: 61
End: 2021-09-14

## 2021-09-14 DIAGNOSIS — R79.89 ELEVATED LFTS: Primary | ICD-10-CM

## 2021-09-16 DIAGNOSIS — Z12.31 VISIT FOR SCREENING MAMMOGRAM: ICD-10-CM

## 2021-09-30 ENCOUNTER — LAB (OUTPATIENT)
Dept: FAMILY MEDICINE CLINIC | Facility: CLINIC | Age: 61
End: 2021-09-30

## 2021-09-30 VITALS — TEMPERATURE: 97.3 F

## 2022-01-31 DIAGNOSIS — G25.81 RESTLESS LEG SYNDROME: ICD-10-CM

## 2022-01-31 RX ORDER — ROPINIROLE 1 MG/1
TABLET, FILM COATED ORAL
Qty: 30 TABLET | Refills: 1 | Status: SHIPPED | OUTPATIENT
Start: 2022-01-31 | End: 2022-03-02 | Stop reason: SDUPTHER

## 2022-03-02 ENCOUNTER — OFFICE VISIT (OUTPATIENT)
Dept: FAMILY MEDICINE CLINIC | Facility: CLINIC | Age: 62
End: 2022-03-02

## 2022-03-02 VITALS
TEMPERATURE: 97.8 F | HEIGHT: 63 IN | OXYGEN SATURATION: 100 % | SYSTOLIC BLOOD PRESSURE: 124 MMHG | HEART RATE: 123 BPM | DIASTOLIC BLOOD PRESSURE: 72 MMHG | WEIGHT: 145.6 LBS | BODY MASS INDEX: 25.8 KG/M2

## 2022-03-02 DIAGNOSIS — Z72.0 TOBACCO USE: ICD-10-CM

## 2022-03-02 DIAGNOSIS — K21.9 GASTROESOPHAGEAL REFLUX DISEASE WITHOUT ESOPHAGITIS: ICD-10-CM

## 2022-03-02 DIAGNOSIS — R79.89 ELEVATED LFTS: ICD-10-CM

## 2022-03-02 DIAGNOSIS — G25.81 RESTLESS LEG SYNDROME: ICD-10-CM

## 2022-03-02 DIAGNOSIS — E78.5 HYPERLIPIDEMIA, UNSPECIFIED HYPERLIPIDEMIA TYPE: Primary | ICD-10-CM

## 2022-03-02 PROBLEM — M16.11 OSTEOARTHRITIS OF RIGHT HIP: Status: ACTIVE | Noted: 2021-11-29

## 2022-03-02 PROCEDURE — 99214 OFFICE O/P EST MOD 30 MIN: CPT | Performed by: FAMILY MEDICINE

## 2022-03-02 RX ORDER — BUPROPION HYDROCHLORIDE 200 MG/1
200 TABLET, EXTENDED RELEASE ORAL 2 TIMES DAILY
Qty: 180 TABLET | Refills: 1 | Status: SHIPPED | OUTPATIENT
Start: 2022-03-02 | End: 2022-09-02

## 2022-03-02 RX ORDER — ROPINIROLE 1 MG/1
1 TABLET, FILM COATED ORAL NIGHTLY
Qty: 90 TABLET | Refills: 1 | Status: SHIPPED | OUTPATIENT
Start: 2022-03-02

## 2022-03-02 NOTE — PROGRESS NOTES
"Chief Complaint  Hyperlipidemia and Heartburn    Subjective          Lucrecia Hernandez presents to [facility]  History of Present Illness    The patient presents today for follow-up for hyperlipidemia, acid reflux, and restless leg syndrome.    Hyperlipidemia  The patient states she has not been on a low-fat diet, but denies eating fast food. She admits that she eats chocolate everyday.     Restless leg syndrome   She is currently taking ropinirole and it is working well.     Acid reflux  The patient states she has taken omeprazole as needed since her symptom has been improved.    Tobacco use  She reports that she is unable to quit smoking. She is currently taking Wellbutrin 150 mg twice daily.    History of right hip arthroplasty  She states that she underwent right hip arthroplasty on 12/09/2021. She states she is doing well and did not take a lot of pain medicine. The patient reports that she had left hip arthroplasty in 2019.     Health maintenance  The patient has received a COVID-19 booster and an influenza vaccine. She states she has not seen a gynecologist for a Pap smear. She reports that her last two Pap smears were normal.      Objective   Vital Signs:   /72   Pulse (!) 123   Temp 97.8 °F (36.6 °C)   Ht 160 cm (63\")   Wt 66 kg (145 lb 9.6 oz)   SpO2 100%   BMI 25.79 kg/m²     Physical Exam  Constitutional:       General: She is not in acute distress.     Appearance: Normal appearance. She is well-developed.   HENT:      Head: Normocephalic and atraumatic.      Right Ear: Tympanic membrane, ear canal and external ear normal.      Left Ear: Tympanic membrane, ear canal and external ear normal.      Mouth/Throat:      Mouth: Mucous membranes are moist.      Pharynx: Oropharynx is clear.   Eyes:      Conjunctiva/sclera: Conjunctivae normal.      Pupils: Pupils are equal, round, and reactive to light.   Neck:      Thyroid: No thyromegaly.   Cardiovascular:      Rate and " Rhythm: Normal rate and regular rhythm.      Heart sounds: No murmur heard.      Pulmonary:      Effort: Pulmonary effort is normal.      Breath sounds: Normal breath sounds. No wheezing.   Musculoskeletal:         General: Normal range of motion.      Cervical back: Neck supple.   Lymphadenopathy:      Cervical: No cervical adenopathy.   Skin:     General: Skin is warm and dry.   Neurological:      Mental Status: She is alert and oriented to person, place, and time.   Psychiatric:         Mood and Affect: Mood normal.         Behavior: Behavior normal.        Result Review :            Lipid Panel (08/26/2021 09:14)  Comprehensive Metabolic Panel (08/26/2021 09:14)  Hepatic Function Panel (09/30/2021 09:01)       Assessment and Plan    Diagnoses and all orders for this visit:    1. Hyperlipidemia, unspecified hyperlipidemia type (Primary)  -     Lipid Panel  -     Comprehensive Metabolic Panel  -  We will recheck her cholesterol and liver function today.    2. Gastroesophageal reflux disease without esophagitis    3. Restless leg syndrome  -     rOPINIRole (REQUIP) 1 MG tablet; Take 1 tablet by mouth Every Night. Take 1 hour before bedtime.  Dispense: 90 tablet; Refill: 1  -  A refill of ropinirole was provided.    4. Elevated LFTs    5. Tobacco use  -     buPROPion SR (Wellbutrin SR) 200 MG 12 hr tablet; Take 1 tablet by mouth 2 (Two) Times a Day.  Dispense: 180 tablet; Refill: 1  -  Wellbutrin will be increased to 200 mg twice daily.      She will return to the clinic in 6 months for an annual exam and will follow up sooner pending the results of labs.      Follow Up   Return in about 6 months (around 9/2/2022) for Annual physical.  Patient was given instructions and counseling regarding her condition or for health maintenance advice. Please see specific information pulled into the AVS if appropriate.       Transcribed from ambient dictation for Meredith Lea Kehrer, MD by KELI MOORE.  03/02/22   16:32  EST    Patient verbalized consent to the visit recording.  I have personally performed the services described in this document as transcribed by the above individual, and it is both accurate and complete.  Meredith Lea Kehrer, MD  3/6/2022  14:15 EST

## 2022-03-03 DIAGNOSIS — E78.5 HYPERLIPIDEMIA, UNSPECIFIED HYPERLIPIDEMIA TYPE: Primary | ICD-10-CM

## 2022-03-03 LAB
ALBUMIN SERPL-MCNC: 4.7 G/DL (ref 3.8–4.8)
ALBUMIN/GLOB SERPL: 1.7 {RATIO} (ref 1.2–2.2)
ALP SERPL-CCNC: 119 IU/L (ref 44–121)
ALT SERPL-CCNC: 26 IU/L (ref 0–32)
AST SERPL-CCNC: 23 IU/L (ref 0–40)
BILIRUB SERPL-MCNC: 0.2 MG/DL (ref 0–1.2)
BUN SERPL-MCNC: 11 MG/DL (ref 8–27)
BUN/CREAT SERPL: 11 (ref 12–28)
CALCIUM SERPL-MCNC: 10 MG/DL (ref 8.7–10.3)
CHLORIDE SERPL-SCNC: 102 MMOL/L (ref 96–106)
CHOLEST SERPL-MCNC: 297 MG/DL (ref 100–199)
CO2 SERPL-SCNC: 23 MMOL/L (ref 20–29)
CREAT SERPL-MCNC: 1.01 MG/DL (ref 0.57–1)
EGFR GENE MUT ANL BLD/T: 63 ML/MIN/1.73
GLOBULIN SER CALC-MCNC: 2.7 G/DL (ref 1.5–4.5)
GLUCOSE SERPL-MCNC: 91 MG/DL (ref 65–99)
HDLC SERPL-MCNC: 61 MG/DL
LDLC SERPL CALC-MCNC: 214 MG/DL (ref 0–99)
POTASSIUM SERPL-SCNC: 5.1 MMOL/L (ref 3.5–5.2)
PROT SERPL-MCNC: 7.4 G/DL (ref 6–8.5)
SODIUM SERPL-SCNC: 141 MMOL/L (ref 134–144)
TRIGL SERPL-MCNC: 121 MG/DL (ref 0–149)
VLDLC SERPL CALC-MCNC: 22 MG/DL (ref 5–40)

## 2022-03-03 RX ORDER — ATORVASTATIN CALCIUM 10 MG/1
10 TABLET, FILM COATED ORAL NIGHTLY
Qty: 90 TABLET | Refills: 1 | Status: SHIPPED | OUTPATIENT
Start: 2022-03-03 | End: 2022-09-02 | Stop reason: SDUPTHER

## 2022-09-02 ENCOUNTER — OFFICE VISIT (OUTPATIENT)
Dept: FAMILY MEDICINE CLINIC | Facility: CLINIC | Age: 62
End: 2022-09-02

## 2022-09-02 VITALS
OXYGEN SATURATION: 99 % | TEMPERATURE: 98.7 F | WEIGHT: 145.6 LBS | BODY MASS INDEX: 25.8 KG/M2 | HEART RATE: 64 BPM | HEIGHT: 63 IN | SYSTOLIC BLOOD PRESSURE: 114 MMHG | DIASTOLIC BLOOD PRESSURE: 64 MMHG

## 2022-09-02 DIAGNOSIS — R79.89 ELEVATED LFTS: ICD-10-CM

## 2022-09-02 DIAGNOSIS — G25.81 RESTLESS LEG SYNDROME: ICD-10-CM

## 2022-09-02 DIAGNOSIS — E78.5 HYPERLIPIDEMIA, UNSPECIFIED HYPERLIPIDEMIA TYPE: ICD-10-CM

## 2022-09-02 DIAGNOSIS — Z79.899 CURRENT USE OF PROTON PUMP INHIBITOR: ICD-10-CM

## 2022-09-02 DIAGNOSIS — Z00.00 ROUTINE HEALTH MAINTENANCE: Primary | ICD-10-CM

## 2022-09-02 DIAGNOSIS — Z12.31 VISIT FOR SCREENING MAMMOGRAM: ICD-10-CM

## 2022-09-02 DIAGNOSIS — Z78.0 MENOPAUSE: ICD-10-CM

## 2022-09-02 DIAGNOSIS — K21.9 GASTROESOPHAGEAL REFLUX DISEASE WITHOUT ESOPHAGITIS: ICD-10-CM

## 2022-09-02 PROCEDURE — 99396 PREV VISIT EST AGE 40-64: CPT | Performed by: FAMILY MEDICINE

## 2022-09-02 RX ORDER — ATORVASTATIN CALCIUM 10 MG/1
10 TABLET, FILM COATED ORAL NIGHTLY
Qty: 90 TABLET | Refills: 1 | Status: SHIPPED | OUTPATIENT
Start: 2022-09-02 | End: 2023-03-29

## 2022-09-02 RX ORDER — OMEPRAZOLE 40 MG/1
40 CAPSULE, DELAYED RELEASE ORAL DAILY
Qty: 90 CAPSULE | Refills: 3 | Status: SHIPPED | OUTPATIENT
Start: 2022-09-02

## 2022-09-02 NOTE — PROGRESS NOTES
Subjective   Lucrecia Hernandez is a 62 y.o. female who presents for annual female wellness exam.  Chief Complaint   Patient presents with   • Annual Exam   • Hyperlipidemia     Doing well without complaints.  She is compliant with her cholesterol and reflux medicine.  She still continues to smoke.  The Wellbutrin did not help.    Menstrual History: menopause  Pregnancy History:   Sexual History: not now   Contraception: na  Hormone Replacement Therapy: na  Diet: standard  Exercise: walks daily    Mammogram: 2021  Pap Smear: negative cotest 2018  Bone Density: na  Colon Cancer Screening: cologuard 2017    Immunization History   Administered Date(s) Administered   • COVID-19 (MODERNA) 1st, 2nd, 3rd Dose Only 2021, 2021, 2021   • Flu Vaccine Intradermal Quad 18-64YR 2018, 2022   • FluLaval/Fluzone >6mos 2020   • Hepatitis A 2018, 2019   • Influenza, Unspecified 2018   • Pneumococcal Polysaccharide (PPSV23) 2020   • Td 2011   • Tdap 2020   • flucelvax quad pfs =>4 YRS 2018       The following portions of the patient's history were reviewed and updated as appropriate: allergies, current medications, past family history, past medical history, past social history, past surgical history and problem list.    Past Medical History:   Diagnosis Date   • Acrochordon    • Adjustment reaction with anxiety and depression    • Anxiety    • Degenerative lumbar disc    • Depression    • Elevated transaminase level    • Encounter for long-term (current) use of medications    • Encounter for routine gynecological examination    • Esophageal reflux disease    • Flu vaccine need    • History of anxiety disorder    • History of depression    • Hyperlipidemia    • Insomnia    • LGSIL on Pap smear of cervix    • Lumbar radiculopathy    • Memory change    • Menopausal symptoms    • Need for DTaP and Hib vaccine     History of    • Need for hepatitis A  vaccination    • Need for hepatitis C screening test    • OA (osteoarthritis) of hip    • Pain in joint of left hip    • Pain in joint of right hip    • Screening for malignant neoplasm of colon    • Seborrheic keratosis    • Tobacco use    • Visit for screening mammogram    • Weight gain        Past Surgical History:   Procedure Laterality Date   • LEEP  1996    leep times 2 1996/1997   • OTHER SURGICAL HISTORY  1994    Stab Phlebectomy of Varicose veins one extremity-Abstracted from [facility]   • TOTAL HIP ARTHROPLASTY Bilateral     left 2019, right 2021   • VEIN SURGERY  1994    vein resection       Family History   Problem Relation Age of Onset   • Hypertension Mother    • Hyperlipidemia Mother    • Coronary artery disease Mother    • Heart disease Mother    • Cancer Father 65        renal cell ca   • Seizures Father    • Lupus Father    • Heart disease Father    • Bipolar disorder Father    • Hypertension Brother    • Lung cancer Maternal Grandmother 78   • Other Paternal Grandmother         benign brain tumor   • Diabetes Paternal Grandmother    • Osteoporosis Paternal Grandmother    • Breast cancer Paternal Grandmother 80   • Diabetes Paternal Grandfather    • Coronary artery disease Paternal Grandfather    • Colon cancer Paternal Grandfather 66   • Bipolar disorder Other        Social History     Socioeconomic History   • Marital status:    Tobacco Use   • Smoking status: Smoker, Current Status Unknown   • Smokeless tobacco: Never Used   Substance and Sexual Activity   • Alcohol use: Yes     Alcohol/week: 4.0 - 6.0 standard drinks     Types: 4 - 6 Cans of beer per week   • Drug use: No   • Sexual activity: Never     Partners: Male     Birth control/protection: None         Current Outpatient Medications:   •  atorvastatin (LIPITOR) 10 MG tablet, Take 1 tablet by mouth Every Night., Disp: 90 tablet, Rfl: 1  •  diclofenac (VOLTAREN) 1 % gel gel, Apply 4 g topically to the appropriate area as directed 4  "(Four) Times a Day As Needed (pain)., Disp: 100 g, Rfl: 3  •  omeprazole (priLOSEC) 40 MG capsule, Take 1 capsule by mouth Daily., Disp: 90 capsule, Rfl: 3  •  rOPINIRole (REQUIP) 1 MG tablet, Take 1 tablet by mouth Every Night. Take 1 hour before bedtime., Disp: 90 tablet, Rfl: 1    Review of Systems    Objective   Vitals:    09/02/22 0829   BP: 114/64   Pulse: 64   Temp: 98.7 °F (37.1 °C)   SpO2: 99%   Weight: 66 kg (145 lb 9.6 oz)   Height: 160 cm (63\")     Body mass index is 25.79 kg/m².  Physical Exam  Vitals and nursing note reviewed.   Constitutional:       General: She is not in acute distress.     Appearance: Normal appearance. She is well-developed.   HENT:      Head: Normocephalic and atraumatic.      Right Ear: Tympanic membrane, ear canal and external ear normal.      Left Ear: Tympanic membrane, ear canal and external ear normal.      Nose: Nose normal.      Mouth/Throat:      Mouth: Mucous membranes are moist.      Pharynx: Oropharynx is clear. No oropharyngeal exudate or posterior oropharyngeal erythema.   Eyes:      Conjunctiva/sclera: Conjunctivae normal.      Pupils: Pupils are equal, round, and reactive to light.   Neck:      Thyroid: No thyromegaly.   Cardiovascular:      Rate and Rhythm: Normal rate and regular rhythm.      Heart sounds: No murmur heard.  Pulmonary:      Effort: Pulmonary effort is normal.      Breath sounds: Normal breath sounds. No wheezing.   Abdominal:      General: Abdomen is flat. Bowel sounds are normal. There is no distension.      Palpations: Abdomen is soft. There is no mass.      Tenderness: There is no abdominal tenderness.      Hernia: No hernia is present.   Musculoskeletal:         General: No swelling. Normal range of motion.      Cervical back: Normal range of motion and neck supple.      Right lower leg: No edema.      Left lower leg: No edema.   Lymphadenopathy:      Cervical: No cervical adenopathy.   Skin:     General: Skin is warm and dry.      Capillary " Refill: Capillary refill takes less than 2 seconds.      Findings: No rash.   Neurological:      General: No focal deficit present.      Mental Status: She is alert and oriented to person, place, and time.      Cranial Nerves: No cranial nerve deficit.   Psychiatric:         Mood and Affect: Mood normal.         Behavior: Behavior normal.           Assessment & Plan   Diagnoses and all orders for this visit:    1. Routine health maintenance (Primary)    2. Hyperlipidemia, unspecified hyperlipidemia type  -     atorvastatin (LIPITOR) 10 MG tablet; Take 1 tablet by mouth Every Night.  Dispense: 90 tablet; Refill: 1  -     TSH  -     Lipid Panel  -     CBC & Differential  -     Comprehensive Metabolic Panel    3. Gastroesophageal reflux disease without esophagitis  -     omeprazole (priLOSEC) 40 MG capsule; Take 1 capsule by mouth Daily.  Dispense: 90 capsule; Refill: 3    4. Restless leg syndrome    5. Elevated LFTs  -     Comprehensive Metabolic Panel    6. Current use of proton pump inhibitor  -     Vitamin B12    7. Visit for screening mammogram  -     Mammo Screening Digital Tomosynthesis Bilateral With CAD; Future    8. Menopause  -     DEXA Bone Density Axial; Future               Discussed the importance of maintaining a healthy weight and getting regular exercise.  Educated patient on the benefits of healthy diet.  Advise follow-up annually for wellness exams.    There are no Patient Instructions on file for this visit.

## 2022-09-03 LAB
ALBUMIN SERPL-MCNC: 4.8 G/DL (ref 3.5–5.2)
ALBUMIN/GLOB SERPL: 2.5 G/DL
ALP SERPL-CCNC: 94 U/L (ref 39–117)
ALT SERPL-CCNC: 19 U/L (ref 1–33)
AST SERPL-CCNC: 18 U/L (ref 1–32)
BASOPHILS # BLD AUTO: 0.06 10*3/MM3 (ref 0–0.2)
BASOPHILS NFR BLD AUTO: 0.9 % (ref 0–1.5)
BILIRUB SERPL-MCNC: 0.4 MG/DL (ref 0–1.2)
BUN SERPL-MCNC: 11 MG/DL (ref 8–23)
BUN/CREAT SERPL: 11.2 (ref 7–25)
CALCIUM SERPL-MCNC: 9.9 MG/DL (ref 8.6–10.5)
CHLORIDE SERPL-SCNC: 103 MMOL/L (ref 98–107)
CHOLEST SERPL-MCNC: 183 MG/DL (ref 0–200)
CO2 SERPL-SCNC: 25.9 MMOL/L (ref 22–29)
CREAT SERPL-MCNC: 0.98 MG/DL (ref 0.57–1)
EGFRCR-CYS SERPLBLD CKD-EPI 2021: 64.6 ML/MIN/1.73
EOSINOPHIL # BLD AUTO: 0.12 10*3/MM3 (ref 0–0.4)
EOSINOPHIL NFR BLD AUTO: 1.9 % (ref 0.3–6.2)
ERYTHROCYTE [DISTWIDTH] IN BLOOD BY AUTOMATED COUNT: 12.9 % (ref 12.3–15.4)
GLOBULIN SER CALC-MCNC: 1.9 GM/DL
GLUCOSE SERPL-MCNC: 86 MG/DL (ref 65–99)
HCT VFR BLD AUTO: 41.3 % (ref 34–46.6)
HDLC SERPL-MCNC: 63 MG/DL (ref 40–60)
HGB BLD-MCNC: 13.7 G/DL (ref 12–15.9)
IMM GRANULOCYTES # BLD AUTO: 0.01 10*3/MM3 (ref 0–0.05)
IMM GRANULOCYTES NFR BLD AUTO: 0.2 % (ref 0–0.5)
LDLC SERPL CALC-MCNC: 103 MG/DL (ref 0–100)
LYMPHOCYTES # BLD AUTO: 2.1 10*3/MM3 (ref 0.7–3.1)
LYMPHOCYTES NFR BLD AUTO: 32.9 % (ref 19.6–45.3)
MCH RBC QN AUTO: 28.5 PG (ref 26.6–33)
MCHC RBC AUTO-ENTMCNC: 33.2 G/DL (ref 31.5–35.7)
MCV RBC AUTO: 85.9 FL (ref 79–97)
MONOCYTES # BLD AUTO: 0.45 10*3/MM3 (ref 0.1–0.9)
MONOCYTES NFR BLD AUTO: 7 % (ref 5–12)
NEUTROPHILS # BLD AUTO: 3.65 10*3/MM3 (ref 1.7–7)
NEUTROPHILS NFR BLD AUTO: 57.1 % (ref 42.7–76)
NRBC BLD AUTO-RTO: 0.2 /100 WBC (ref 0–0.2)
PLATELET # BLD AUTO: 243 10*3/MM3 (ref 140–450)
POTASSIUM SERPL-SCNC: 4.6 MMOL/L (ref 3.5–5.2)
PROT SERPL-MCNC: 6.7 G/DL (ref 6–8.5)
RBC # BLD AUTO: 4.81 10*6/MM3 (ref 3.77–5.28)
SODIUM SERPL-SCNC: 139 MMOL/L (ref 136–145)
TRIGL SERPL-MCNC: 95 MG/DL (ref 0–150)
TSH SERPL DL<=0.005 MIU/L-ACNC: 1.01 UIU/ML (ref 0.27–4.2)
VIT B12 SERPL-MCNC: 548 PG/ML (ref 211–946)
VLDLC SERPL CALC-MCNC: 17 MG/DL (ref 5–40)
WBC # BLD AUTO: 6.39 10*3/MM3 (ref 3.4–10.8)

## 2022-09-22 DIAGNOSIS — Z78.0 MENOPAUSE: ICD-10-CM

## 2022-09-22 DIAGNOSIS — Z12.31 VISIT FOR SCREENING MAMMOGRAM: ICD-10-CM

## 2023-03-29 DIAGNOSIS — E78.5 HYPERLIPIDEMIA, UNSPECIFIED HYPERLIPIDEMIA TYPE: ICD-10-CM

## 2023-03-29 RX ORDER — ATORVASTATIN CALCIUM 10 MG/1
TABLET, FILM COATED ORAL
Qty: 90 TABLET | Refills: 1 | Status: SHIPPED | OUTPATIENT
Start: 2023-03-29

## 2023-11-10 DIAGNOSIS — E78.5 HYPERLIPIDEMIA, UNSPECIFIED HYPERLIPIDEMIA TYPE: ICD-10-CM

## 2023-11-10 RX ORDER — ATORVASTATIN CALCIUM 10 MG/1
TABLET, FILM COATED ORAL
Qty: 90 TABLET | Refills: 1 | Status: SHIPPED | OUTPATIENT
Start: 2023-11-10

## 2023-11-15 ENCOUNTER — OFFICE VISIT (OUTPATIENT)
Dept: FAMILY MEDICINE CLINIC | Facility: CLINIC | Age: 63
End: 2023-11-15
Payer: COMMERCIAL

## 2023-11-15 VITALS
WEIGHT: 142.6 LBS | HEART RATE: 78 BPM | SYSTOLIC BLOOD PRESSURE: 142 MMHG | HEIGHT: 63 IN | OXYGEN SATURATION: 99 % | BODY MASS INDEX: 25.27 KG/M2 | DIASTOLIC BLOOD PRESSURE: 88 MMHG | TEMPERATURE: 98.3 F

## 2023-11-15 DIAGNOSIS — Z23 FLU VACCINE NEED: ICD-10-CM

## 2023-11-15 DIAGNOSIS — R03.0 ELEVATED BLOOD PRESSURE READING: ICD-10-CM

## 2023-11-15 DIAGNOSIS — Z12.11 SCREEN FOR COLON CANCER: ICD-10-CM

## 2023-11-15 DIAGNOSIS — Z96.643 S/P BILATERAL HIP REPLACEMENTS: ICD-10-CM

## 2023-11-15 DIAGNOSIS — K21.9 GASTROESOPHAGEAL REFLUX DISEASE WITHOUT ESOPHAGITIS: ICD-10-CM

## 2023-11-15 DIAGNOSIS — E78.5 HYPERLIPIDEMIA, UNSPECIFIED HYPERLIPIDEMIA TYPE: Primary | ICD-10-CM

## 2023-11-15 DIAGNOSIS — Z12.31 VISIT FOR SCREENING MAMMOGRAM: ICD-10-CM

## 2023-11-15 DIAGNOSIS — Z72.0 TOBACCO USE: ICD-10-CM

## 2023-11-15 PROBLEM — G25.81 RESTLESS LEG SYNDROME: Status: RESOLVED | Noted: 2023-11-15 | Resolved: 2023-11-15

## 2023-11-15 PROBLEM — G25.81 RESTLESS LEG SYNDROME: Status: ACTIVE | Noted: 2023-11-15

## 2023-11-15 RX ORDER — OMEPRAZOLE 40 MG/1
40 CAPSULE, DELAYED RELEASE ORAL DAILY
Qty: 90 CAPSULE | Refills: 2 | Status: SHIPPED | OUTPATIENT
Start: 2023-11-15

## 2023-11-15 RX ORDER — ATORVASTATIN CALCIUM 10 MG/1
10 TABLET, FILM COATED ORAL
Qty: 90 TABLET | Refills: 2 | Status: SHIPPED | OUTPATIENT
Start: 2023-11-15

## 2023-11-15 NOTE — PROGRESS NOTES
"Chief Complaint  Med Refill and Hyperlipidemia    Subjective        Lucrecia Hernandez presents to [facility]  History of Present Illness  Patient presents for follow-up on her dyslipidemia and reflux.  She is doing well and compliant with her medication.  Her RLS medicine was discontinued after both hip replacements.  Her legs got much better.  She still continues to smoke and would like to get lung cancer screening.  She was not scheduled for a physical today but wants to take care of a couple health maintenance issues.  She has no acute complaints today.      Objective   Vital Signs:  /88   Pulse 78   Temp 98.3 °F (36.8 °C)   Ht 160 cm (63\")   Wt 64.7 kg (142 lb 9.6 oz)   SpO2 99%   BMI 25.26 kg/m²   Estimated body mass index is 25.26 kg/m² as calculated from the following:    Height as of this encounter: 160 cm (63\").    Weight as of this encounter: 64.7 kg (142 lb 9.6 oz).             Physical Exam  Constitutional:       General: She is not in acute distress.     Appearance: Normal appearance. She is well-developed.   HENT:      Head: Normocephalic and atraumatic.      Right Ear: Tympanic membrane, ear canal and external ear normal.      Left Ear: Tympanic membrane, ear canal and external ear normal.      Mouth/Throat:      Mouth: Mucous membranes are moist.      Pharynx: Oropharynx is clear.   Eyes:      Conjunctiva/sclera: Conjunctivae normal.      Pupils: Pupils are equal, round, and reactive to light.   Neck:      Thyroid: No thyromegaly.   Cardiovascular:      Rate and Rhythm: Normal rate and regular rhythm.      Heart sounds: No murmur heard.  Pulmonary:      Effort: Pulmonary effort is normal.      Breath sounds: Normal breath sounds. No wheezing.   Musculoskeletal:         General: Normal range of motion.      Cervical back: Neck supple.   Lymphadenopathy:      Cervical: No cervical adenopathy.   Skin:     General: Skin is warm and dry.   Neurological:      Mental " Status: She is alert and oriented to person, place, and time.   Psychiatric:         Mood and Affect: Mood normal.         Behavior: Behavior normal.        Result Review :                   Assessment and Plan   Diagnoses and all orders for this visit:    1. Hyperlipidemia, unspecified hyperlipidemia type (Primary)  -     Lipid Panel  -     TSH  -     CBC & Differential  -     Comprehensive Metabolic Panel  -     atorvastatin (LIPITOR) 10 MG tablet; Take 1 tablet by mouth every night at bedtime.  Dispense: 90 tablet; Refill: 2    2. Gastroesophageal reflux disease without esophagitis  -     omeprazole (priLOSEC) 40 MG capsule; Take 1 capsule by mouth Daily.  Dispense: 90 capsule; Refill: 2    3. S/P bilateral hip replacements    4. Flu vaccine need  -     Fluzone High-Dose 65+yrs ([id number])    5. Screen for colon cancer  -     Cologuard - Stool, Per Rectum; Future    6. Tobacco use  -     CT Chest Low Dose Wo; Future    7. Visit for screening mammogram  -     Mammo Screening Digital Tomosynthesis Bilateral With CAD; Future    8. Elevated blood pressure reading    Hyperlipidemia-check labs and refill prescription today  GERD-controlled continue current medication  Health maintenance-as above  Schedule for physical in 6 months with Pap  Elevated blood pressure reading-we will monitor, patient given information, encouraged to check her blood pressure at home and bring readings with her to her physical         Follow Up   Return in about 6 months (around 5/15/2024) for Annual physical with pap.  Patient was given instructions and counseling regarding her condition or for health maintenance advice. Please see specific information pulled into the AVS if appropriate.

## 2023-11-16 LAB
ALBUMIN SERPL-MCNC: 4.8 G/DL (ref 3.5–5.2)
ALBUMIN/GLOB SERPL: 2.2 G/DL
ALP SERPL-CCNC: 98 U/L (ref 39–117)
ALT SERPL-CCNC: 34 U/L (ref 1–33)
AST SERPL-CCNC: 30 U/L (ref 1–32)
BASOPHILS # BLD AUTO: 0.06 10*3/MM3 (ref 0–0.2)
BASOPHILS NFR BLD AUTO: 1 % (ref 0–1.5)
BILIRUB SERPL-MCNC: 0.3 MG/DL (ref 0–1.2)
BUN SERPL-MCNC: 12 MG/DL (ref 8–23)
BUN/CREAT SERPL: 13.8 (ref 7–25)
CALCIUM SERPL-MCNC: 10 MG/DL (ref 8.6–10.5)
CHLORIDE SERPL-SCNC: 105 MMOL/L (ref 98–107)
CHOLEST SERPL-MCNC: 189 MG/DL (ref 0–200)
CO2 SERPL-SCNC: 27.9 MMOL/L (ref 22–29)
CREAT SERPL-MCNC: 0.87 MG/DL (ref 0.57–1)
EGFRCR SERPLBLD CKD-EPI 2021: 74 ML/MIN/1.73
EOSINOPHIL # BLD AUTO: 0.1 10*3/MM3 (ref 0–0.4)
EOSINOPHIL NFR BLD AUTO: 1.7 % (ref 0.3–6.2)
ERYTHROCYTE [DISTWIDTH] IN BLOOD BY AUTOMATED COUNT: 12.8 % (ref 12.3–15.4)
GLOBULIN SER CALC-MCNC: 2.2 GM/DL
GLUCOSE SERPL-MCNC: 91 MG/DL (ref 65–99)
HCT VFR BLD AUTO: 41.2 % (ref 34–46.6)
HDLC SERPL-MCNC: 63 MG/DL (ref 40–60)
HGB BLD-MCNC: 13.8 G/DL (ref 12–15.9)
IMM GRANULOCYTES # BLD AUTO: 0.01 10*3/MM3 (ref 0–0.05)
IMM GRANULOCYTES NFR BLD AUTO: 0.2 % (ref 0–0.5)
LDLC SERPL CALC-MCNC: 116 MG/DL (ref 0–100)
LYMPHOCYTES # BLD AUTO: 2.12 10*3/MM3 (ref 0.7–3.1)
LYMPHOCYTES NFR BLD AUTO: 36.3 % (ref 19.6–45.3)
MCH RBC QN AUTO: 30.1 PG (ref 26.6–33)
MCHC RBC AUTO-ENTMCNC: 33.5 G/DL (ref 31.5–35.7)
MCV RBC AUTO: 89.8 FL (ref 79–97)
MONOCYTES # BLD AUTO: 0.44 10*3/MM3 (ref 0.1–0.9)
MONOCYTES NFR BLD AUTO: 7.5 % (ref 5–12)
NEUTROPHILS # BLD AUTO: 3.11 10*3/MM3 (ref 1.7–7)
NEUTROPHILS NFR BLD AUTO: 53.3 % (ref 42.7–76)
NRBC BLD AUTO-RTO: 0 /100 WBC (ref 0–0.2)
PLATELET # BLD AUTO: 231 10*3/MM3 (ref 140–450)
POTASSIUM SERPL-SCNC: 4.4 MMOL/L (ref 3.5–5.2)
PROT SERPL-MCNC: 7 G/DL (ref 6–8.5)
RBC # BLD AUTO: 4.59 10*6/MM3 (ref 3.77–5.28)
SODIUM SERPL-SCNC: 140 MMOL/L (ref 136–145)
TRIGL SERPL-MCNC: 53 MG/DL (ref 0–150)
TSH SERPL DL<=0.005 MIU/L-ACNC: 1.49 UIU/ML (ref 0.27–4.2)
VLDLC SERPL CALC-MCNC: 10 MG/DL (ref 5–40)
WBC # BLD AUTO: 5.84 10*3/MM3 (ref 3.4–10.8)

## 2023-12-05 DIAGNOSIS — Z12.31 VISIT FOR SCREENING MAMMOGRAM: ICD-10-CM

## 2023-12-20 ENCOUNTER — TELEPHONE (OUTPATIENT)
Dept: FAMILY MEDICINE CLINIC | Facility: CLINIC | Age: 63
End: 2023-12-20
Payer: COMMERCIAL

## 2023-12-20 NOTE — TELEPHONE ENCOUNTER
Spoke with pt about overdue Chest CT.  Gave number to call and schedule.  Pt stated nobody ever called her to schedule.  I will postpone for 2 months

## 2024-02-12 DIAGNOSIS — Z72.0 TOBACCO USE: ICD-10-CM
